# Patient Record
Sex: FEMALE
[De-identification: names, ages, dates, MRNs, and addresses within clinical notes are randomized per-mention and may not be internally consistent; named-entity substitution may affect disease eponyms.]

---

## 2019-10-29 ENCOUNTER — APPOINTMENT (OUTPATIENT)
Dept: OTOLARYNGOLOGY | Facility: CLINIC | Age: 69
End: 2019-10-29
Payer: MEDICARE

## 2019-10-29 VITALS
BODY MASS INDEX: 33.66 KG/M2 | WEIGHT: 190 LBS | HEIGHT: 63 IN | DIASTOLIC BLOOD PRESSURE: 73 MMHG | SYSTOLIC BLOOD PRESSURE: 135 MMHG | HEART RATE: 61 BPM

## 2019-10-29 DIAGNOSIS — Z87.39 PERSONAL HISTORY OF OTHER DISEASES OF THE MUSCULOSKELETAL SYSTEM AND CONNECTIVE TISSUE: ICD-10-CM

## 2019-10-29 DIAGNOSIS — Z80.0 FAMILY HISTORY OF MALIGNANT NEOPLASM OF DIGESTIVE ORGANS: ICD-10-CM

## 2019-10-29 DIAGNOSIS — Z87.891 PERSONAL HISTORY OF NICOTINE DEPENDENCE: ICD-10-CM

## 2019-10-29 DIAGNOSIS — H92.01 OTALGIA, RIGHT EAR: ICD-10-CM

## 2019-10-29 DIAGNOSIS — H90.3 SENSORINEURAL HEARING LOSS, BILATERAL: ICD-10-CM

## 2019-10-29 DIAGNOSIS — Z86.79 PERSONAL HISTORY OF OTHER DISEASES OF THE CIRCULATORY SYSTEM: ICD-10-CM

## 2019-10-29 PROBLEM — Z00.00 ENCOUNTER FOR PREVENTIVE HEALTH EXAMINATION: Status: ACTIVE | Noted: 2019-10-29

## 2019-10-29 PROCEDURE — 99213 OFFICE O/P EST LOW 20 MIN: CPT | Mod: 25

## 2019-10-29 PROCEDURE — 31231 NASAL ENDOSCOPY DX: CPT

## 2019-10-29 PROCEDURE — 92557 COMPREHENSIVE HEARING TEST: CPT

## 2019-10-29 PROCEDURE — 92567 TYMPANOMETRY: CPT

## 2019-10-29 RX ORDER — NAPROXEN 500 MG/1
500 TABLET ORAL
Qty: 90 | Refills: 0 | Status: ACTIVE | COMMUNITY
Start: 2019-08-05

## 2019-10-29 RX ORDER — ROSUVASTATIN CALCIUM 10 MG/1
10 TABLET, FILM COATED ORAL
Qty: 30 | Refills: 0 | Status: ACTIVE | COMMUNITY
Start: 2019-08-05

## 2019-10-29 RX ORDER — LANSOPRAZOLE 30 MG/1
30 CAPSULE, DELAYED RELEASE ORAL
Qty: 90 | Refills: 0 | Status: ACTIVE | COMMUNITY
Start: 2019-09-04

## 2019-10-29 RX ORDER — HYDROCHLOROTHIAZIDE 12.5 MG/1
12.5 TABLET ORAL
Qty: 30 | Refills: 0 | Status: ACTIVE | COMMUNITY
Start: 2019-06-07

## 2019-10-29 RX ORDER — IRBESARTAN 150 MG/1
150 TABLET ORAL
Refills: 0 | Status: ACTIVE | COMMUNITY

## 2019-10-29 RX ORDER — METOPROLOL SUCCINATE 100 MG/1
100 TABLET, EXTENDED RELEASE ORAL
Qty: 90 | Refills: 0 | Status: ACTIVE | COMMUNITY
Start: 2019-06-12

## 2019-10-29 RX ORDER — CIPROFLOXACIN HYDROCHLORIDE 500 MG/1
500 TABLET, FILM COATED ORAL
Qty: 14 | Refills: 0 | Status: COMPLETED | COMMUNITY
Start: 2019-05-09

## 2019-10-29 RX ORDER — IRBESARTAN AND HYDROCHLOROTHIAZIDE 150; 12.5 MG/1; MG/1
150-12.5 TABLET ORAL
Qty: 90 | Refills: 0 | Status: ACTIVE | COMMUNITY
Start: 2019-08-10

## 2019-10-29 NOTE — ASSESSMENT
[FreeTextEntry1] : She has right ear and neck pain likely due to exacerbation of TMJ dysfunction. She also has recurrent dizziness.  Her ear was normal exam. Audiogram showed mild to moderate bilateral sensorineural hearing loss. She did have a type AS tympanogram on the right. Nasal endoscopy showed a deviated septum and pale boggy mucosa. There was no obvious sinus infection. She also had reflux-related laryngeal changes\par \par PLAN\par \par -findings and management options discussed in detail with the patient. \par -good aural hygiene\par -noise precautions\par -annual audiogram\par -Vestibular therapy\par -Neurology evaluation recommended\par -I also asked her see a physiatrist for TMJ dysfunction\par -Continue treatment for TMJ dysfunction\par -Nasal steroid spray and antihistamines as needed for nasal congestion\par -Reflux precautions and OTC medication as needed\par -follow up in 4 weeks\par -call and return earlier if any concerns. \par

## 2019-10-29 NOTE — HISTORY OF PRESENT ILLNESS
[de-identified] : ANDREA HERNANDEZ is a 69 year patient Has a history of vertigo, hearing loss, facial pain, ear pain, and TMJ dysfunction. She has had dizziness for 10-12 days. She also has right ear and neck pain, and headache. She tried home exercises for the dizziness but it has not improved. She does have throat mucous and mild nasal congestion. She uses a  for TMJ dysfunction.\par \par ENT history\par MRI in 2015 was negative for IAC masses\par CT scan of the brain showed mild ethmoid mucosal thickening\par 2015 VNG positive for benign positional vertigo. ECoG was normal\par

## 2019-11-07 ENCOUNTER — APPOINTMENT (OUTPATIENT)
Dept: PHYSICAL MEDICINE AND REHAB | Facility: CLINIC | Age: 69
End: 2019-11-07
Payer: MEDICARE

## 2019-11-07 VITALS
HEART RATE: 59 BPM | WEIGHT: 190 LBS | HEIGHT: 63 IN | BODY MASS INDEX: 33.66 KG/M2 | DIASTOLIC BLOOD PRESSURE: 73 MMHG | SYSTOLIC BLOOD PRESSURE: 125 MMHG

## 2019-11-07 DIAGNOSIS — M79.10 MYALGIA, UNSPECIFIED SITE: ICD-10-CM

## 2019-11-07 DIAGNOSIS — G89.29 CERVICALGIA: ICD-10-CM

## 2019-11-07 DIAGNOSIS — Z78.9 OTHER SPECIFIED HEALTH STATUS: ICD-10-CM

## 2019-11-07 DIAGNOSIS — R68.84 JAW PAIN: ICD-10-CM

## 2019-11-07 DIAGNOSIS — M79.18 MYALGIA, OTHER SITE: ICD-10-CM

## 2019-11-07 DIAGNOSIS — M26.621 ARTHRALGIA OF RIGHT TEMPOROMANDIBULAR JOINT: ICD-10-CM

## 2019-11-07 DIAGNOSIS — M54.2 CERVICALGIA: ICD-10-CM

## 2019-11-07 DIAGNOSIS — G89.29 JAW PAIN: ICD-10-CM

## 2019-11-07 PROCEDURE — 99204 OFFICE O/P NEW MOD 45 MIN: CPT

## 2019-11-07 RX ORDER — IRBESARTAN 300 MG/1
300 TABLET ORAL
Qty: 30 | Refills: 0 | Status: DISCONTINUED | COMMUNITY
Start: 2019-06-07 | End: 2019-11-07

## 2019-11-07 RX ORDER — NITROFURANTOIN (MONOHYDRATE/MACROCRYSTALS) 25; 75 MG/1; MG/1
100 CAPSULE ORAL
Qty: 14 | Refills: 0 | Status: DISCONTINUED | COMMUNITY
Start: 2019-05-06 | End: 2019-11-07

## 2019-11-07 RX ORDER — LANSOPRAZOLE 30 MG/1
TABLET, ORALLY DISINTEGRATING ORAL
Refills: 0 | Status: DISCONTINUED | COMMUNITY
End: 2019-11-07

## 2019-11-07 RX ORDER — ROSUVASTATIN CALCIUM 5 MG/1
TABLET, FILM COATED ORAL
Refills: 0 | Status: DISCONTINUED | COMMUNITY
End: 2019-11-07

## 2019-11-07 RX ORDER — METOPROLOL SUCCINATE 100 MG/1
TABLET, EXTENDED RELEASE ORAL
Refills: 0 | Status: DISCONTINUED | COMMUNITY
End: 2019-11-07

## 2019-11-07 NOTE — CONSULT LETTER
[FreeTextEntry1] : Dear Dr. MAGDALENO CARDOZA\par \par I had the pleasure of evaluating your patient, ANDREA HERNANDEZ .\par \par Thank you very much for allowing me to participate in the care of this patient. If you have any questions, please do not hesitate to contact me. \par \par Sincerely, \par Meagan Becker MD \par \par ABPMR Board Certified in Physical Medicine and Rehabilitation\par Certified Fellow of AANEM (Neuromuscular and Electrodiagnostic Medicine)\par Subspecialty certified in Sports Medicine (ABPMR)\par \par  of Physical Medicine and Rehabilitation\par [employer] [facility] [employer] [facility]\par [facility]\par \par

## 2019-11-07 NOTE — ASSESSMENT
[FreeTextEntry1] : \par PLAN AND RECOMMENDATIONS :\par \par We discussed differential diagnosis and clinical impression\par She has significant TMJ and neck muscle spasm \par \par Recommend\par .symptomatic care and support\par  medications OTC \par   IMAGING NOT NEEDED AT THIS POINT AS DIAGNOSIS IS CLINICAL\par \par  referral to PT \par  hydrotherapy /heat / cold for pain\par  continue mouth guard \par   avoidance of painful activity where possible \par \par  return for follow up\par if not better in 6 weeks consider botox inj R masseter and TMJ area and post neck

## 2019-11-07 NOTE — PHYSICAL EXAM
[FreeTextEntry1] : PHYSICAL EXAM : OBJECTIVE \par \par GENERAL : Awake ,alert and oriented to time place and person \par HEAD : normocephalic and atraumatic \par NECK : supple ,no tracheal deviation ,no thyroid enlargement noted with swallowing\par EYES : sclera and conjunctiva normal no redness,intact extraocular movements \par ENT  : ears and nose normal in appearance -hearing adequate \par PULMONARY: effort normal. No respiratory distress. breathing regular. No wheezes \par LYMPH : No swelling in limbs, capillary return within normal range \par CVS : warm extremities,no palpitations,not short of breath, no visible jugular venous distention\par PSYCH : mood and affect normal ,good eye contact ,normal attention \par ABDOMEN : no visible distension , \par NEUROLOGICAL:cranial nerves intact,muscle tone normal,gait and balance safe except where noted below \par SKIN : warm and dry No rash detected over specific body areas examined \par MUSCULOSKELETAL: normal muscle bulk, no focal bony tenderness /posture normal except where specified below\par R TMJ tender ++ \par trigger point R masseter\par head forward \par sore R traps \par No long tract signs found on clinical exam and no focal neurological deficits\par gait NL \par ROM shoulder/ c spine WNR \par

## 2019-11-07 NOTE — HISTORY OF PRESENT ILLNESS
[FreeTextEntry1] : Ms. ANDREA HERNANDEZ is a very pleasant 69 year female who comes in for evaluation of R TMJ jaw and neck pain   that has been ongoing for months started in 2016 but very bad last 4 months  without any specific injury or inciting event. She wears a mouth guard at night but feels aching and stiff with it -gets better as day wears on  The pain is located primarily R jaw and TMJ joint  intermittent in nature and described as intractable ache . The pain is rated as 5/10 during today's visit, and ranges from 4-10/10. The patient's symptoms are aggravated by swallowing   and alleviated by not much  . The patient denies any night pain, numbness/tingling, weakness, or bowel/bladder dysfunction. 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

## 2019-11-07 NOTE — REVIEW OF SYSTEMS
[Joint Pain] : joint pain [Muscle Pain] : muscle pain [Dizziness] : dizziness [Headache] : no headaches [Fainting] : no fainting [Negative] : Neurological [FreeTextEntry9] : R TMJ

## 2019-12-19 ENCOUNTER — APPOINTMENT (OUTPATIENT)
Dept: OTOLARYNGOLOGY | Facility: CLINIC | Age: 69
End: 2019-12-19
Payer: MEDICARE

## 2019-12-19 DIAGNOSIS — K21.9 GASTRO-ESOPHAGEAL REFLUX DISEASE W/OUT ESOPHAGITIS: ICD-10-CM

## 2019-12-19 DIAGNOSIS — M26.609 UNSPECIFIED TEMPOROMANDIBULAR JOINT DISORDER: ICD-10-CM

## 2019-12-19 DIAGNOSIS — J31.0 CHRONIC RHINITIS: ICD-10-CM

## 2019-12-19 PROCEDURE — 99213 OFFICE O/P EST LOW 20 MIN: CPT | Mod: 25

## 2019-12-19 PROCEDURE — 31231 NASAL ENDOSCOPY DX: CPT

## 2019-12-19 RX ORDER — IPRATROPIUM BROMIDE 21 UG/1
0.03 SPRAY NASAL
Qty: 1 | Refills: 3 | Status: ACTIVE | COMMUNITY
Start: 2019-12-19 | End: 1900-01-01

## 2019-12-19 NOTE — ASSESSMENT
[FreeTextEntry1] : She has right ear, neck, and neck pain due to TMJ dysfunction. She started physical therapy. She also has chronic rhinitis. There was no sinus infection on nasal endoscopy.\par \par PLAN\par \par -findings and management options discussed in detail with the patient. \par -she may also consider neurology evaluation recommended\par -Continue treatment for TMJ dysfunction \par -continue PT\par -trial of Atrovent. She may try a nasal steroid spray again. She did not feel it helped in the past. \par -Reflux precautions and OTC medication as needed\par -follow up in approximately 2 months. \par -call and return earlier if any concerns. \par

## 2019-12-19 NOTE — HISTORY OF PRESENT ILLNESS
[de-identified] : 10/29/19Juan HERNANDEZ is a 69 year patient Has a history of vertigo, hearing loss, facial pain, ear pain, and TMJ dysfunction. She has had dizziness for 10-12 days. She also has right ear and neck pain, and headache. She tried home exercises for the dizziness but it has not improved. She does have throat mucous and mild nasal congestion. She uses a  for TMJ dysfunction.\par \par ENT history\par MRI in 2015 was negative for IAC masses\par CT scan of the brain showed mild ethmoid mucosal thickening\par 2015 VNG positive for benign positional vertigo. ECoG was normal\par \par  [FreeTextEntry1] : \par 12/19/19- Trish Eldridge Is here for followup. She saw Dr. Becker and started PT for TMJ dysfunction. She has only had one session. She continues to have right neck, ear, and facial pain. She also complains of 2-3 week history of nasal drainage. It may occur with the change in temperature. She has mild sinus pressure. She is not using allergy or sinus medications

## 2020-06-25 ENCOUNTER — APPOINTMENT (OUTPATIENT)
Dept: OPHTHALMOLOGY | Facility: CLINIC | Age: 70
End: 2020-06-25
Payer: MEDICARE

## 2020-06-25 ENCOUNTER — NON-APPOINTMENT (OUTPATIENT)
Age: 70
End: 2020-06-25

## 2020-06-25 PROCEDURE — 92004 COMPRE OPH EXAM NEW PT 1/>: CPT

## 2021-06-24 ENCOUNTER — APPOINTMENT (OUTPATIENT)
Dept: OPHTHALMOLOGY | Facility: CLINIC | Age: 71
End: 2021-06-24
Payer: MEDICARE

## 2021-06-24 ENCOUNTER — NON-APPOINTMENT (OUTPATIENT)
Age: 71
End: 2021-06-24

## 2021-06-24 PROCEDURE — 92014 COMPRE OPH EXAM EST PT 1/>: CPT

## 2021-06-24 PROCEDURE — 99072 ADDL SUPL MATRL&STAF TM PHE: CPT

## 2021-10-19 ENCOUNTER — APPOINTMENT (OUTPATIENT)
Dept: OPHTHALMOLOGY | Facility: CLINIC | Age: 71
End: 2021-10-19
Payer: MEDICARE

## 2021-10-19 ENCOUNTER — NON-APPOINTMENT (OUTPATIENT)
Age: 71
End: 2021-10-19

## 2021-10-19 PROCEDURE — 92012 INTRM OPH EXAM EST PATIENT: CPT

## 2022-09-30 ENCOUNTER — NON-APPOINTMENT (OUTPATIENT)
Age: 72
End: 2022-09-30

## 2022-09-30 ENCOUNTER — APPOINTMENT (OUTPATIENT)
Dept: OPHTHALMOLOGY | Facility: CLINIC | Age: 72
End: 2022-09-30

## 2022-09-30 PROCEDURE — 92134 CPTRZ OPH DX IMG PST SGM RTA: CPT

## 2022-09-30 PROCEDURE — 92014 COMPRE OPH EXAM EST PT 1/>: CPT

## 2023-08-08 ENCOUNTER — NON-APPOINTMENT (OUTPATIENT)
Age: 73
End: 2023-08-08

## 2023-09-28 ENCOUNTER — APPOINTMENT (OUTPATIENT)
Dept: OPHTHALMOLOGY | Facility: CLINIC | Age: 73
End: 2023-09-28
Payer: MEDICARE

## 2023-09-28 ENCOUNTER — NON-APPOINTMENT (OUTPATIENT)
Age: 73
End: 2023-09-28

## 2023-09-28 PROCEDURE — 92014 COMPRE OPH EXAM EST PT 1/>: CPT

## 2024-03-28 ENCOUNTER — APPOINTMENT (OUTPATIENT)
Dept: OPHTHALMOLOGY | Facility: CLINIC | Age: 74
End: 2024-03-28

## 2024-05-15 ENCOUNTER — APPOINTMENT (OUTPATIENT)
Dept: OPHTHALMOLOGY | Facility: CLINIC | Age: 74
End: 2024-05-15
Payer: MEDICARE

## 2024-05-15 ENCOUNTER — NON-APPOINTMENT (OUTPATIENT)
Age: 74
End: 2024-05-15

## 2024-05-15 PROCEDURE — 92012 INTRM OPH EXAM EST PATIENT: CPT

## 2024-08-08 ENCOUNTER — NON-APPOINTMENT (OUTPATIENT)
Age: 74
End: 2024-08-08